# Patient Record
Sex: MALE | Race: WHITE | Employment: FULL TIME | URBAN - METROPOLITAN AREA
[De-identification: names, ages, dates, MRNs, and addresses within clinical notes are randomized per-mention and may not be internally consistent; named-entity substitution may affect disease eponyms.]

---

## 2017-01-29 ENCOUNTER — HOSPITAL ENCOUNTER (OUTPATIENT)
Dept: OTHER | Age: 57
Discharge: HOME | End: 2017-01-30
Attending: PSYCHIATRY & NEUROLOGY | Admitting: PSYCHIATRY & NEUROLOGY

## 2017-01-29 DIAGNOSIS — G47.33 OSA (OBSTRUCTIVE SLEEP APNEA): ICD-10-CM

## 2017-02-08 ENCOUNTER — TELEPHONE (OUTPATIENT)
Dept: PULMONOLOGY | Age: 57
End: 2017-02-08

## 2017-02-15 ENCOUNTER — TELEPHONE (OUTPATIENT)
Dept: PULMONOLOGY | Age: 57
End: 2017-02-15

## 2017-03-20 ENCOUNTER — OFFICE VISIT (OUTPATIENT)
Dept: SLEEP MEDICINE | Age: 57
End: 2017-03-20

## 2017-03-20 VITALS
HEIGHT: 67 IN | DIASTOLIC BLOOD PRESSURE: 80 MMHG | HEART RATE: 81 BPM | SYSTOLIC BLOOD PRESSURE: 138 MMHG | WEIGHT: 215 LBS | BODY MASS INDEX: 33.74 KG/M2 | RESPIRATION RATE: 16 BRPM | OXYGEN SATURATION: 98 %

## 2017-03-20 DIAGNOSIS — G47.33 OSA TREATED WITH BIPAP: Primary | ICD-10-CM

## 2017-03-20 PROCEDURE — 99213 OFFICE O/P EST LOW 20 MIN: CPT | Performed by: PSYCHIATRY & NEUROLOGY

## 2017-03-20 ASSESSMENT — SLEEP AND FATIGUE QUESTIONNAIRES
HOW LIKELY ARE YOU TO NOD OFF OR FALL ASLEEP WHILE SITTING QUIETLY AFTER LUNCH WITHOUT ALCOHOL: 1
HOW LIKELY ARE YOU TO NOD OFF OR FALL ASLEEP IN A CAR, WHILE STOPPED FOR A FEW MINUTES IN TRAFFIC: 0
HOW LIKELY ARE YOU TO NOD OFF OR FALL ASLEEP WHILE WATCHING TV: 2
HOW LIKELY ARE YOU TO NOD OFF OR FALL ASLEEP WHEN YOU ARE A PASSENGER IN A CAR FOR AN HOUR WITHOUT A BREAK: 0
HOW LIKELY ARE YOU TO NOD OFF OR FALL ASLEEP WHILE SITTING AND READING: 0
HOW LIKELY ARE YOU TO NOD OFF OR FALL ASLEEP WHILE SITTING AND TALKING TO SOMEONE: 0
HOW LIKELY ARE YOU TO NOD OFF OR FALL ASLEEP WHILE LYING DOWN TO REST IN THE AFTERNOON WHEN CIRCUMSTANCES PERMIT: 2
ESS TOTAL SCORE: 6
HOW LIKELY ARE YOU TO NOD OFF OR FALL ASLEEP WHILE SITTING INACTIVE IN A PUBLIC PLACE: 1

## 2017-03-29 ENCOUNTER — HOSPITAL ENCOUNTER (OUTPATIENT)
Dept: OTHER | Age: 57
Discharge: HOME | End: 2017-03-29
Attending: INTERNAL MEDICINE | Admitting: INTERNAL MEDICINE

## 2017-03-29 VITALS
OXYGEN SATURATION: 96 % | HEART RATE: 93 BPM | TEMPERATURE: 98.4 F | RESPIRATION RATE: 18 BRPM | DIASTOLIC BLOOD PRESSURE: 91 MMHG | SYSTOLIC BLOOD PRESSURE: 151 MMHG

## 2017-03-29 LAB
RAPID INFLUENZA  B AGN: NEGATIVE
RAPID INFLUENZA A AGN: POSITIVE

## 2019-10-31 ENCOUNTER — HOSPITAL ENCOUNTER (OUTPATIENT)
Dept: GENERAL RADIOLOGY | Age: 59
Discharge: HOME | End: 2019-10-31
Payer: COMMERCIAL

## 2019-10-31 ENCOUNTER — HOSPITAL ENCOUNTER (OUTPATIENT)
Dept: NEUROLOGY | Age: 59
Discharge: HOME | End: 2019-10-31
Payer: COMMERCIAL

## 2019-10-31 ENCOUNTER — HOSPITAL ENCOUNTER (OUTPATIENT)
Age: 59
Discharge: HOME | End: 2019-10-31
Payer: COMMERCIAL

## 2019-10-31 DIAGNOSIS — M79.641 PAIN IN BOTH HANDS: ICD-10-CM

## 2019-10-31 DIAGNOSIS — M79.642 PAIN IN BOTH HANDS: ICD-10-CM

## 2019-10-31 PROCEDURE — 95910 NRV CNDJ TEST 7-8 STUDIES: CPT

## 2019-10-31 PROCEDURE — 73130 X-RAY EXAM OF HAND: CPT

## 2019-10-31 PROCEDURE — 95861 NEEDLE EMG 2 EXTREMITIES: CPT

## 2021-04-14 ENCOUNTER — APPOINTMENT (OUTPATIENT)
Dept: GENERAL RADIOLOGY | Age: 61
DRG: 177 | End: 2021-04-14
Payer: COMMERCIAL

## 2021-04-14 ENCOUNTER — APPOINTMENT (OUTPATIENT)
Dept: CT IMAGING | Age: 61
DRG: 177 | End: 2021-04-14
Payer: COMMERCIAL

## 2021-04-14 ENCOUNTER — HOSPITAL ENCOUNTER (INPATIENT)
Age: 61
LOS: 1 days | Discharge: HOME | DRG: 177 | End: 2021-04-15
Attending: INTERNAL MEDICINE | Admitting: INTERNAL MEDICINE
Payer: COMMERCIAL

## 2021-04-14 DIAGNOSIS — I26.99 PULMONARY EMBOLISM ON RIGHT (HCC): ICD-10-CM

## 2021-04-14 DIAGNOSIS — J12.9 VIRAL PNEUMONIA: ICD-10-CM

## 2021-04-14 DIAGNOSIS — E87.1 HYPONATREMIA: Primary | ICD-10-CM

## 2021-04-14 DIAGNOSIS — U07.1 COVID-19: ICD-10-CM

## 2021-04-14 DIAGNOSIS — R55 SYNCOPE AND COLLAPSE: ICD-10-CM

## 2021-04-14 LAB
A/G RATIO: 1.7 (ref 1.1–2.2)
ALBUMIN SERPL-MCNC: 4 G/DL (ref 3.4–5)
ALP BLD-CCNC: 77 U/L (ref 40–129)
ALT SERPL-CCNC: 33 U/L (ref 10–40)
ANION GAP SERPL CALCULATED.3IONS-SCNC: 10 MMOL/L (ref 3–16)
AST SERPL-CCNC: 42 U/L (ref 15–37)
BASOPHILS ABSOLUTE: 0 K/UL (ref 0–0.2)
BASOPHILS RELATIVE PERCENT: 0.2 %
BILIRUB SERPL-MCNC: 0.5 MG/DL (ref 0–1)
BILIRUBIN URINE: NEGATIVE
BLOOD, URINE: NEGATIVE
BUN BLDV-MCNC: 15 MG/DL (ref 7–20)
CALCIUM SERPL-MCNC: 8.2 MG/DL (ref 8.3–10.6)
CHLORIDE BLD-SCNC: 96 MMOL/L (ref 99–110)
CLARITY: CLEAR
CO2: 23 MMOL/L (ref 21–32)
COLOR: YELLOW
CREAT SERPL-MCNC: 1 MG/DL (ref 0.8–1.3)
D DIMER: 283 NG/ML DDU (ref 0–229)
EKG ATRIAL RATE: 80 BPM
EKG DIAGNOSIS: NORMAL
EKG P AXIS: 64 DEGREES
EKG P-R INTERVAL: 166 MS
EKG Q-T INTERVAL: 390 MS
EKG QRS DURATION: 102 MS
EKG QTC CALCULATION (BAZETT): 449 MS
EKG R AXIS: -26 DEGREES
EKG T AXIS: 45 DEGREES
EKG VENTRICULAR RATE: 80 BPM
EOSINOPHILS ABSOLUTE: 0 K/UL (ref 0–0.6)
EOSINOPHILS RELATIVE PERCENT: 0.1 %
GFR AFRICAN AMERICAN: >60
GFR NON-AFRICAN AMERICAN: >60
GLOBULIN: 2.4 G/DL
GLUCOSE BLD-MCNC: 119 MG/DL (ref 70–99)
GLUCOSE BLD-MCNC: 135 MG/DL (ref 70–99)
GLUCOSE URINE: NEGATIVE MG/DL
HCT VFR BLD CALC: 44.7 % (ref 40.5–52.5)
HEMOGLOBIN: 15.2 G/DL (ref 13.5–17.5)
KETONES, URINE: NEGATIVE MG/DL
LACTIC ACID: 0.9 MMOL/L (ref 0.4–2)
LEUKOCYTE ESTERASE, URINE: NEGATIVE
LIPASE: 68 U/L (ref 13–60)
LYMPHOCYTES ABSOLUTE: 1.2 K/UL (ref 1–5.1)
LYMPHOCYTES RELATIVE PERCENT: 18.8 %
MCH RBC QN AUTO: 28.1 PG (ref 26–34)
MCHC RBC AUTO-ENTMCNC: 34.1 G/DL (ref 31–36)
MCV RBC AUTO: 82.4 FL (ref 80–100)
MICROSCOPIC EXAMINATION: NORMAL
MONOCYTES ABSOLUTE: 0.3 K/UL (ref 0–1.3)
MONOCYTES RELATIVE PERCENT: 5.5 %
NEUTROPHILS ABSOLUTE: 4.7 K/UL (ref 1.7–7.7)
NEUTROPHILS RELATIVE PERCENT: 75.4 %
NITRITE, URINE: NEGATIVE
PDW BLD-RTO: 13.9 % (ref 12.4–15.4)
PERFORMED ON: ABNORMAL
PH UA: 6 (ref 5–8)
PLATELET # BLD: 106 K/UL (ref 135–450)
PMV BLD AUTO: 9 FL (ref 5–10.5)
POTASSIUM REFLEX MAGNESIUM: 3.8 MMOL/L (ref 3.5–5.1)
PRO-BNP: 52 PG/ML (ref 0–124)
PROTEIN UA: NEGATIVE MG/DL
RBC # BLD: 5.42 M/UL (ref 4.2–5.9)
SODIUM BLD-SCNC: 129 MMOL/L (ref 136–145)
SPECIFIC GRAVITY UA: >1.03 (ref 1–1.03)
TOTAL PROTEIN: 6.4 G/DL (ref 6.4–8.2)
TROPONIN: <0.01 NG/ML
URINE REFLEX TO CULTURE: NORMAL
URINE TYPE: NORMAL
UROBILINOGEN, URINE: 0.2 E.U./DL
WBC # BLD: 6.2 K/UL (ref 4–11)

## 2021-04-14 PROCEDURE — 83605 ASSAY OF LACTIC ACID: CPT

## 2021-04-14 PROCEDURE — 80053 COMPREHEN METABOLIC PANEL: CPT

## 2021-04-14 PROCEDURE — 70450 CT HEAD/BRAIN W/O DYE: CPT

## 2021-04-14 PROCEDURE — 84484 ASSAY OF TROPONIN QUANT: CPT

## 2021-04-14 PROCEDURE — 2580000003 HC RX 258: Performed by: NURSE PRACTITIONER

## 2021-04-14 PROCEDURE — 83690 ASSAY OF LIPASE: CPT

## 2021-04-14 PROCEDURE — 71260 CT THORAX DX C+: CPT

## 2021-04-14 PROCEDURE — 93010 ELECTROCARDIOGRAM REPORT: CPT | Performed by: INTERNAL MEDICINE

## 2021-04-14 PROCEDURE — 94761 N-INVAS EAR/PLS OXIMETRY MLT: CPT

## 2021-04-14 PROCEDURE — 83036 HEMOGLOBIN GLYCOSYLATED A1C: CPT

## 2021-04-14 PROCEDURE — 36415 COLL VENOUS BLD VENIPUNCTURE: CPT

## 2021-04-14 PROCEDURE — 85379 FIBRIN DEGRADATION QUANT: CPT

## 2021-04-14 PROCEDURE — 6360000002 HC RX W HCPCS: Performed by: INTERNAL MEDICINE

## 2021-04-14 PROCEDURE — 1200000000 HC SEMI PRIVATE

## 2021-04-14 PROCEDURE — 99285 EMERGENCY DEPT VISIT HI MDM: CPT

## 2021-04-14 PROCEDURE — 85025 COMPLETE CBC W/AUTO DIFF WBC: CPT

## 2021-04-14 PROCEDURE — 6370000000 HC RX 637 (ALT 250 FOR IP): Performed by: NURSE PRACTITIONER

## 2021-04-14 PROCEDURE — 81003 URINALYSIS AUTO W/O SCOPE: CPT

## 2021-04-14 PROCEDURE — 2580000003 HC RX 258: Performed by: INTERNAL MEDICINE

## 2021-04-14 PROCEDURE — 83880 ASSAY OF NATRIURETIC PEPTIDE: CPT

## 2021-04-14 PROCEDURE — 6360000004 HC RX CONTRAST MEDICATION: Performed by: NURSE PRACTITIONER

## 2021-04-14 PROCEDURE — 6370000000 HC RX 637 (ALT 250 FOR IP): Performed by: INTERNAL MEDICINE

## 2021-04-14 PROCEDURE — 71045 X-RAY EXAM CHEST 1 VIEW: CPT

## 2021-04-14 PROCEDURE — 93005 ELECTROCARDIOGRAM TRACING: CPT | Performed by: NURSE PRACTITIONER

## 2021-04-14 RX ORDER — METOPROLOL SUCCINATE 50 MG/1
100 TABLET, EXTENDED RELEASE ORAL DAILY
Status: DISCONTINUED | OUTPATIENT
Start: 2021-04-15 | End: 2021-04-15 | Stop reason: HOSPADM

## 2021-04-14 RX ORDER — BUDESONIDE AND FORMOTEROL FUMARATE DIHYDRATE 160; 4.5 UG/1; UG/1
2 AEROSOL RESPIRATORY (INHALATION) 2 TIMES DAILY
Status: DISCONTINUED | OUTPATIENT
Start: 2021-04-14 | End: 2021-04-15 | Stop reason: HOSPADM

## 2021-04-14 RX ORDER — NICOTINE POLACRILEX 4 MG
15 LOZENGE BUCCAL PRN
Status: DISCONTINUED | OUTPATIENT
Start: 2021-04-14 | End: 2021-04-15 | Stop reason: HOSPADM

## 2021-04-14 RX ORDER — SODIUM CHLORIDE 0.9 % (FLUSH) 0.9 %
5-40 SYRINGE (ML) INJECTION PRN
Status: DISCONTINUED | OUTPATIENT
Start: 2021-04-14 | End: 2021-04-15 | Stop reason: HOSPADM

## 2021-04-14 RX ORDER — PROMETHAZINE HYDROCHLORIDE 25 MG/1
12.5 TABLET ORAL EVERY 6 HOURS PRN
Status: DISCONTINUED | OUTPATIENT
Start: 2021-04-14 | End: 2021-04-15 | Stop reason: HOSPADM

## 2021-04-14 RX ORDER — POLYETHYLENE GLYCOL 3350 17 G/17G
17 POWDER, FOR SOLUTION ORAL DAILY PRN
Status: DISCONTINUED | OUTPATIENT
Start: 2021-04-14 | End: 2021-04-15 | Stop reason: HOSPADM

## 2021-04-14 RX ORDER — DEXTROSE MONOHYDRATE 50 MG/ML
100 INJECTION, SOLUTION INTRAVENOUS PRN
Status: DISCONTINUED | OUTPATIENT
Start: 2021-04-14 | End: 2021-04-15 | Stop reason: HOSPADM

## 2021-04-14 RX ORDER — SODIUM CHLORIDE 0.9 % (FLUSH) 0.9 %
5-40 SYRINGE (ML) INJECTION EVERY 12 HOURS SCHEDULED
Status: DISCONTINUED | OUTPATIENT
Start: 2021-04-14 | End: 2021-04-15 | Stop reason: HOSPADM

## 2021-04-14 RX ORDER — NIFEDIPINE 30 MG/1
30 TABLET, EXTENDED RELEASE ORAL DAILY
Status: DISCONTINUED | OUTPATIENT
Start: 2021-04-15 | End: 2021-04-15 | Stop reason: HOSPADM

## 2021-04-14 RX ORDER — 0.9 % SODIUM CHLORIDE 0.9 %
1000 INTRAVENOUS SOLUTION INTRAVENOUS ONCE
Status: COMPLETED | OUTPATIENT
Start: 2021-04-14 | End: 2021-04-14

## 2021-04-14 RX ORDER — ACETAMINOPHEN 500 MG
1000 TABLET ORAL ONCE
Status: COMPLETED | OUTPATIENT
Start: 2021-04-14 | End: 2021-04-14

## 2021-04-14 RX ORDER — ACETAMINOPHEN 325 MG/1
650 TABLET ORAL EVERY 6 HOURS PRN
Status: DISCONTINUED | OUTPATIENT
Start: 2021-04-14 | End: 2021-04-15 | Stop reason: HOSPADM

## 2021-04-14 RX ORDER — MONTELUKAST SODIUM 10 MG/1
10 TABLET ORAL NIGHTLY
Status: DISCONTINUED | OUTPATIENT
Start: 2021-04-14 | End: 2021-04-15 | Stop reason: HOSPADM

## 2021-04-14 RX ORDER — ONDANSETRON 2 MG/ML
4 INJECTION INTRAMUSCULAR; INTRAVENOUS EVERY 6 HOURS PRN
Status: DISCONTINUED | OUTPATIENT
Start: 2021-04-14 | End: 2021-04-15 | Stop reason: HOSPADM

## 2021-04-14 RX ORDER — ACETAMINOPHEN 650 MG/1
650 SUPPOSITORY RECTAL EVERY 6 HOURS PRN
Status: DISCONTINUED | OUTPATIENT
Start: 2021-04-14 | End: 2021-04-15 | Stop reason: HOSPADM

## 2021-04-14 RX ORDER — LEVOTHYROXINE SODIUM 112 UG/1
112 TABLET ORAL DAILY
Status: DISCONTINUED | OUTPATIENT
Start: 2021-04-15 | End: 2021-04-15 | Stop reason: HOSPADM

## 2021-04-14 RX ORDER — PRAVASTATIN SODIUM 20 MG
20 TABLET ORAL NIGHTLY
Status: DISCONTINUED | OUTPATIENT
Start: 2021-04-14 | End: 2021-04-15 | Stop reason: HOSPADM

## 2021-04-14 RX ORDER — SODIUM CHLORIDE 9 MG/ML
INJECTION, SOLUTION INTRAVENOUS CONTINUOUS
Status: DISCONTINUED | OUTPATIENT
Start: 2021-04-14 | End: 2021-04-15 | Stop reason: HOSPADM

## 2021-04-14 RX ORDER — GUAIFENESIN/DEXTROMETHORPHAN 100-10MG/5
5 SYRUP ORAL ONCE
Status: COMPLETED | OUTPATIENT
Start: 2021-04-14 | End: 2021-04-14

## 2021-04-14 RX ORDER — DEXTROSE MONOHYDRATE 25 G/50ML
12.5 INJECTION, SOLUTION INTRAVENOUS PRN
Status: DISCONTINUED | OUTPATIENT
Start: 2021-04-14 | End: 2021-04-15 | Stop reason: HOSPADM

## 2021-04-14 RX ORDER — SODIUM CHLORIDE 9 MG/ML
25 INJECTION, SOLUTION INTRAVENOUS PRN
Status: DISCONTINUED | OUTPATIENT
Start: 2021-04-14 | End: 2021-04-15 | Stop reason: HOSPADM

## 2021-04-14 RX ADMIN — ENOXAPARIN SODIUM 90 MG: 100 INJECTION SUBCUTANEOUS at 17:52

## 2021-04-14 RX ADMIN — MONTELUKAST 10 MG: 10 TABLET, FILM COATED ORAL at 20:36

## 2021-04-14 RX ADMIN — Medication 2 PUFF: at 20:47

## 2021-04-14 RX ADMIN — ACETAMINOPHEN 1000 MG: 500 TABLET ORAL at 11:36

## 2021-04-14 RX ADMIN — PRAVASTATIN SODIUM 20 MG: 20 TABLET ORAL at 20:36

## 2021-04-14 RX ADMIN — IOPAMIDOL 75 ML: 755 INJECTION, SOLUTION INTRAVENOUS at 11:43

## 2021-04-14 RX ADMIN — SODIUM CHLORIDE 1000 ML: 9 INJECTION, SOLUTION INTRAVENOUS at 11:35

## 2021-04-14 RX ADMIN — Medication 10 ML: at 20:36

## 2021-04-14 RX ADMIN — SODIUM CHLORIDE: 9 INJECTION, SOLUTION INTRAVENOUS at 16:43

## 2021-04-14 RX ADMIN — GUAIFENESIN SYRUP AND DEXTROMETHORPHAN 5 ML: 100; 10 SYRUP ORAL at 11:35

## 2021-04-14 ASSESSMENT — PAIN SCALES - GENERAL: PAINLEVEL_OUTOF10: 0

## 2021-04-14 NOTE — PROGRESS NOTES
4 Eyes Skin Assessment     NAME:  Mazin Cervantes  YOB: 1960  MEDICAL RECORD NUMBER:  [id number]    The patient is being assess for  Admission    I agree that 2 RN's have performed a thorough Head to Toe Skin Assessment on the patient. ALL assessment sites listed below have been assessed. Areas assessed by both nurses:    Head, Face, Ears, Shoulders, Back, Chest, Arms, Elbows, Hands, Sacrum. Buttock, Coccyx, Ischium and Legs. Feet and Heels        Does the Patient have a Wound?  No noted wound(s)       Kj Prevention initiated:  No   Wound Care Orders initiated:  No    Pressure Injury (Stage 3,4, Unstageable, DTI, NWPT, and Complex wounds) if present place consult order under [de-identified] No    New and Established Ostomies if present place consult order under : No      Nurse 1 eSignature: Electronically signed by Tressie Nissen, RN on 4/14/21 at 6:57 PM EDT    **SHARE this note so that the co-signing nurse is able to place an eSignature**    Nurse 2 eSignature: Electronically signed by Paulo Davey RN on 4/15/21 at 1:36 AM EDT

## 2021-04-14 NOTE — ED PROVIDER NOTES
[address]  [address] [address]  Dept: [phone]  Loc: [address] ENCOUNTER        This patient was not seen or evaluated by the attending physician. I evaluated this patient, the attending physician was available for consultation. CHIEF COMPLAINT    Chief Complaint   Patient presents with    Fatigue     covid + , this morning felt weak and passed out, not SOB       HPI    Mazin Cervantes is a 59 y.o. male who presents with a syncopal episode. Onset was just shortly prior to arrival. The duration of the syncopal episode was very brief, states that he did have some prodromal symptoms. States that before he syncopized he felt weak, lightheaded and like he was going to pass out. He states that next thing he knows son was standing over him. He was diagnosed with Covid-19 approximately 1 week ago. Has not been eating or drinking very much since then. Has not complained of any chest pain or shortness of breath currently. Denies any specific head injury that he knows of. Is not on any anticoagulants. No post injury nausea or vomiting. No numbness or tingling in any extremities. Denies any focal weakness, trouble speaking or swallowing. The patient was brought in by his son for further evaluation and treatment. REVIEW OF SYSTEMS    Cardiac: no palpitations, no chest pain  Respiratory: no SOB, no new cough  Neurologic: see HPI, no headache, no double vision  GI: no vomiting, no diarrhea  Hematologic: no hematochezia, no hemoptysis  General: no fever, no chills  Musculoskeletal: see HPI  All other systems reviewed and are negative. Jericho Herrera PAST MEDICAL & SURGICAL HISTORY    Past Medical History:   Diagnosis Date    Allergic rhinitis     Asthma     Hypertension     Hypothyroidism     Influenza A 03/29/2017     History reviewed. No pertinent surgical history.     CURRENT MEDICATIONS  (may include discharge medications prescribed in the ED)  Current Outpatient Rx   Medication Sig Dispense Refill    pravastatin (PRAVACHOL) 20 MG tablet TAKE ONE TABLET BY MOUTH DAILY 30 tablet 3    montelukast (SINGULAIR) 10 MG tablet TAKE ONE TABLET BY MOUTH ONCE NIGHTLY 30 tablet 3    NIFEdipine (PROCARDIA XL) 30 MG extended release tablet TAKE ONE TABLET BY MOUTH DAILY 30 tablet 3    metoprolol succinate (TOPROL XL) 100 MG extended release tablet TAKE 1 AND 1/2 TABLET BY MOUTH DAILY 45 tablet 0    levothyroxine (SYNTHROID) 112 MCG tablet 1 tab po daily 90 tablet 0    fluticasone-salmeterol (ADVAIR DISKUS) 250-50 MCG/DOSE AEPB INHALE ONE PUFF BY MOUTH TWICE A DAY 1 Inhaler 3    metFORMIN (GLUCOPHAGE) 500 MG tablet TAKE ONE TABLET BY MOUTH TWICE A DAY WITH MEALS 60 tablet 2    fluticasone (FLONASE) 50 MCG/ACT nasal spray SPRAY TWO SPRAYS IN EACH NOSTRIL ONCE DAILY 1 Bottle 2    metoprolol succinate (TOPROL XL) 100 MG extended release tablet Take 1 tablet by mouth daily 30 tablet 3    albuterol sulfate HFA (VENTOLIN HFA) 108 (90 Base) MCG/ACT inhaler Inhale 2 puffs into the lungs 4 times daily as needed for Wheezing 3 Inhaler 1       ALLERGIES    Allergies   Allergen Reactions    Alcohol     Asa [Aspirin] Other (See Comments)    Ibuprofen Other (See Comments)       SOCIAL & FAMILY HISTORY    Social History     Socioeconomic History    Marital status: Single     Spouse name: None    Number of children: None    Years of education: None    Highest education level: None   Occupational History    None   Social Needs    Financial resource strain: None    Food insecurity     Worry: None     Inability: None    Transportation needs     Medical: None     Non-medical: None   Tobacco Use    Smoking status: Former Smoker     Quit date: 1986     Years since quittin.3    Smokeless tobacco: Never Used   Substance and Sexual Activity    Alcohol use: No    Drug use: No    Sexual activity: None Lifestyle    Physical activity     Days per week: None     Minutes per session: None    Stress: None   Relationships    Social connections     Talks on phone: None     Gets together: None     Attends Congregational service: None     Active member of club or organization: None     Attends meetings of clubs or organizations: None     Relationship status: None    Intimate partner violence     Fear of current or ex partner: None     Emotionally abused: None     Physically abused: None     Forced sexual activity: None   Other Topics Concern    None   Social History Narrative    Caffeine:        SODA - 1-2 week          TEA - 0            COFFEE - 3 cups a day     Family History   Problem Relation Age of Onset    High Cholesterol Mother     High Cholesterol Father        PHYSICAL EXAM    VITAL SIGNS: /67   Pulse 78   Temp 98.2 °F (36.8 °C) (Oral)   Resp 18   Ht 5' 7\" (1.702 m)   Wt 207 lb 15 oz (94.3 kg)   SpO2 92%   BMI 32.57 kg/m²    Constitutional:  Well developed, well nourished, no acute distress  Eyes: Pupils equally round and reactive to light, sclera nonicteric  HENT: Normocephalic, atraumatic, see integument, moist mucus membranes, no hemotympanum bilaterally, external ear canals are not erythematous or edematous, bilateral TMs gray and nonbulging  Neck: supple, no JVD, no posterior neck tenderness  Respiratory:  Lungs clear to auscultation bilaterally, no retractions   Cardiovascular:  Regular rate, no murmurs  GI:  Soft, nontender, normal bowel sounds  Musculoskeletal:  No edema, no acute deformities  Integument:  Skin warm and dry, no petechiae, skin is intact.  Slightly pale  Neurologic: Awake, alert, oriented x4, no aphasia, no slurred speech, CN II-XII intact, normal finger to nose test bilaterally, equal strength in all 4 extremities and moves all 4 extremities spontaneously, sensation to light touch intact bilaterally, patellar reflexes 2+ and equal bilaterally  Vascular: Radial and DP pulses 2+ and equal bilaterally  Psychiatric: Cooperative, pleasant affect     EKG   Please see the physician's note for EKG interpretation. RADIOLOGY  CT CHEST PULMONARY EMBOLISM W CONTRAST   Final Result   1. Subtle filling defect within the right upper lobe segmental pulmonary   artery concerning for an acute pulmonary thromboembolus. 2. Nonspecific bilateral ground-glass opacities favoring   infectious/inflammatory process including atypical viral pneumonia. Critical results were called by Dr. Triston Linda. Edmund Davies MD to Dr. Krista Zaragoza On   4/14/2021 at 12:51. XR CHEST PORTABLE   Final Result   1. No acute abnormality. CT Head WO Contrast   Final Result   No acute intracranial abnormality. Paranasal sinus inflammatory disease.   Possibly of acute sinusitis cannot be   excluded             LABS  Results for orders placed or performed during the hospital encounter of 04/14/21   CBC Auto Differential   Result Value Ref Range    WBC 6.2 4.0 - 11.0 K/uL    RBC 5.42 4.20 - 5.90 M/uL    Hemoglobin 15.2 13.5 - 17.5 g/dL    Hematocrit 44.7 40.5 - 52.5 %    MCV 82.4 80.0 - 100.0 fL    MCH 28.1 26.0 - 34.0 pg    MCHC 34.1 31.0 - 36.0 g/dL    RDW 13.9 12.4 - 15.4 %    Platelets 305 (L) 472 - 450 K/uL    MPV 9.0 5.0 - 10.5 fL    Neutrophils % 75.4 %    Lymphocytes % 18.8 %    Monocytes % 5.5 %    Eosinophils % 0.1 %    Basophils % 0.2 %    Neutrophils Absolute 4.7 1.7 - 7.7 K/uL    Lymphocytes Absolute 1.2 1.0 - 5.1 K/uL    Monocytes Absolute 0.3 0.0 - 1.3 K/uL    Eosinophils Absolute 0.0 0.0 - 0.6 K/uL    Basophils Absolute 0.0 0.0 - 0.2 K/uL   Comprehensive Metabolic Panel w/ Reflex to MG   Result Value Ref Range    Sodium 129 (L) 136 - 145 mmol/L    Potassium reflex Magnesium 3.8 3.5 - 5.1 mmol/L    Chloride 96 (L) 99 - 110 mmol/L    CO2 23 21 - 32 mmol/L    Anion Gap 10 3 - 16    Glucose 135 (H) 70 - 99 mg/dL    BUN 15 7 - 20 mg/dL    CREATININE 1.0 0.8 - 1.3 mg/dL    GFR Non-African American >60 >60    GFR African American >60 >60    Calcium 8.2 (L) 8.3 - 10.6 mg/dL    Total Protein 6.4 6.4 - 8.2 g/dL    Albumin 4.0 3.4 - 5.0 g/dL    Albumin/Globulin Ratio 1.7 1.1 - 2.2    Total Bilirubin 0.5 0.0 - 1.0 mg/dL    Alkaline Phosphatase 77 40 - 129 U/L    ALT 33 10 - 40 U/L    AST 42 (H) 15 - 37 U/L    Globulin 2.4 g/dL   Lactic Acid, Plasma   Result Value Ref Range    Lactic Acid 0.9 0.4 - 2.0 mmol/L   Lipase   Result Value Ref Range    Lipase 68.0 (H) 13.0 - 60.0 U/L   Troponin   Result Value Ref Range    Troponin <0.01 <0.01 ng/mL   Brain Natriuretic Peptide   Result Value Ref Range    Pro-BNP 52 0 - 124 pg/mL   Urinalysis Reflex to Culture    Specimen: Urine, clean catch   Result Value Ref Range    Urine Type NotGiven    D-Dimer, Quantitative   Result Value Ref Range    D-Dimer, Quant 283 (H) 0 - 229 ng/mL DDU   EKG 12 Lead   Result Value Ref Range    Ventricular Rate 80 BPM    Atrial Rate 80 BPM    P-R Interval 166 ms    QRS Duration 102 ms    Q-T Interval 390 ms    QTc Calculation (Bazett) 449 ms    P Axis 64 degrees    R Axis -26 degrees    T Axis 45 degrees    Diagnosis       Normal sinus rhythmNormal ECGNo previous ECGs available         ED COURSE & MEDICAL DECISION MAKING    Pertinent Labs & Imaging studies reviewed and interpreted. (See chart for details)  See chart for details of medications given during the ED stay. Vitals:    04/14/21 1132 04/14/21 1232 04/14/21 1246 04/14/21 1317   BP: 113/64 121/67 124/71 121/67   Pulse: 80 78 78 78   Resp: 19 21 20 18   Temp:       TempSrc:       SpO2: 92% 94% 93% 92%   Weight:       Height:           Differential Diagnosis: Cardiac arrhythmia, drop attack from a subarachnoid hemorrhage, sepsis, dehydration, vasovagal syncope, other    CRITICAL CARE NOTE:  There was a high probability of clinically significant life-threatening deterioration of the patient's condition requiring my urgent intervention. Total critical care time was at least 45 minutes. This includes vital sign monitoring, pulse oximetry monitoring, telemetry monitoring, clinical response to the IV medications, reviewing the nursing notes, consultation time, dictation/documentation time, and interpretation of the labwork. This excludes any separately billable procedures performed. Cardiac Monitor Strip Interpretation  Interpreted by me  Monitor strip interpreted:  Rhythm: normal sinus   Rate: normal  Ectopy: none  Ventricular rate of 80 bpm, ID interval 166, , QTc 449. Patient is afebrile and nontoxic in appearance. Labs reveal no leukocytosis or anemia. Metabolic panel shows no significant electrolyte derangements however he has a slight hyponatremia of 129. Is getting IV fluids. No INES. LFTs and lipase within normal limits. No lactic acidosis. CXR findings as above. CT findings as above. EKG interpreted by ED physician. Troponin negative. Given that he did syncopized I elected to do a D-dimer, especially given that he is Covid positive and there is an increased incidence for PEs associated with Covid-19. This is elevated at 283. Therefore CTA to rule out PE was ordered, full radiology read as above. BNP is not elevated and there does not appear to be any right heart strain. Based off of her Covid risk decompensation score, patient is in a high risk category for decompensation within 24 hours due to Covid-19. Given this patient will need to be admitted for further evaluation and treatment. However given the patient does have a Covid-19, the primary care provider is not admitting these patients at this time. Therefore consulted the hospitalist who agreed to admit patient and write orders for admission. FINAL IMPRESSION    1. Hyponatremia    2. COVID-19    3. Syncope and collapse    4. Pulmonary embolism on right ([address]    5.  Viral pneumonia        PLAN  Admission       (Please note that this note was completed with a voice recognition program.  Every

## 2021-04-14 NOTE — PROGRESS NOTES
This RN received call from [facility] stating new pt in 4270 has arrived. This RN did not receive report. This RN greeted pt he denies any current needs. This RN called down to ED spoke with Charge RN who stated \"I do not know who was taking care of this pt and I will find a RN to call you back. \"    [phone] received call from Juliana [place] in ED who gave report.

## 2021-04-14 NOTE — ED NOTES
ED SBAR report provider to [facility] BONNY LINARES. Patient to be transported to Room 4270 via stretcher by transport tech. Patient transported with bedside cardiac monitor and with IV medications infusing. IV site clean, dry, and intact. MEWS score and pain assessed as 0 and documented. Updated patient on plan of care.        Lurdes Clements RN  04/14/21 1716

## 2021-04-15 VITALS
DIASTOLIC BLOOD PRESSURE: 74 MMHG | OXYGEN SATURATION: 93 % | TEMPERATURE: 98.7 F | SYSTOLIC BLOOD PRESSURE: 140 MMHG | RESPIRATION RATE: 16 BRPM | BODY MASS INDEX: 34.01 KG/M2 | WEIGHT: 216.71 LBS | HEART RATE: 87 BPM | HEIGHT: 67 IN

## 2021-04-15 LAB
ANION GAP SERPL CALCULATED.3IONS-SCNC: 12 MMOL/L (ref 3–16)
BASOPHILS ABSOLUTE: 0 K/UL (ref 0–0.2)
BASOPHILS RELATIVE PERCENT: 0.3 %
BUN BLDV-MCNC: 12 MG/DL (ref 7–20)
CALCIUM SERPL-MCNC: 7.7 MG/DL (ref 8.3–10.6)
CHLORIDE BLD-SCNC: 96 MMOL/L (ref 99–110)
CO2: 21 MMOL/L (ref 21–32)
CREAT SERPL-MCNC: 0.8 MG/DL (ref 0.8–1.3)
EOSINOPHILS ABSOLUTE: 0 K/UL (ref 0–0.6)
EOSINOPHILS RELATIVE PERCENT: 0 %
ESTIMATED AVERAGE GLUCOSE: 128.4 MG/DL
GFR AFRICAN AMERICAN: >60
GFR NON-AFRICAN AMERICAN: >60
GLUCOSE BLD-MCNC: 108 MG/DL (ref 70–99)
GLUCOSE BLD-MCNC: 111 MG/DL (ref 70–99)
HBA1C MFR BLD: 6.1 %
HCT VFR BLD CALC: 41.2 % (ref 40.5–52.5)
HEMOGLOBIN: 14.2 G/DL (ref 13.5–17.5)
LYMPHOCYTES ABSOLUTE: 1.6 K/UL (ref 1–5.1)
LYMPHOCYTES RELATIVE PERCENT: 26.1 %
MCH RBC QN AUTO: 28.6 PG (ref 26–34)
MCHC RBC AUTO-ENTMCNC: 34.5 G/DL (ref 31–36)
MCV RBC AUTO: 82.9 FL (ref 80–100)
MONOCYTES ABSOLUTE: 0.2 K/UL (ref 0–1.3)
MONOCYTES RELATIVE PERCENT: 3.8 %
NEUTROPHILS ABSOLUTE: 4.4 K/UL (ref 1.7–7.7)
NEUTROPHILS RELATIVE PERCENT: 69.8 %
PDW BLD-RTO: 13.9 % (ref 12.4–15.4)
PERFORMED ON: ABNORMAL
PLATELET # BLD: 108 K/UL (ref 135–450)
PMV BLD AUTO: 9.2 FL (ref 5–10.5)
POTASSIUM REFLEX MAGNESIUM: 3.8 MMOL/L (ref 3.5–5.1)
RBC # BLD: 4.97 M/UL (ref 4.2–5.9)
SODIUM BLD-SCNC: 129 MMOL/L (ref 136–145)
WBC # BLD: 6.3 K/UL (ref 4–11)

## 2021-04-15 PROCEDURE — 94640 AIRWAY INHALATION TREATMENT: CPT

## 2021-04-15 PROCEDURE — 97116 GAIT TRAINING THERAPY: CPT

## 2021-04-15 PROCEDURE — 80048 BASIC METABOLIC PNL TOTAL CA: CPT

## 2021-04-15 PROCEDURE — 93970 EXTREMITY STUDY: CPT

## 2021-04-15 PROCEDURE — 6370000000 HC RX 637 (ALT 250 FOR IP): Performed by: INTERNAL MEDICINE

## 2021-04-15 PROCEDURE — 97530 THERAPEUTIC ACTIVITIES: CPT

## 2021-04-15 PROCEDURE — 97161 PT EVAL LOW COMPLEX 20 MIN: CPT

## 2021-04-15 PROCEDURE — 97165 OT EVAL LOW COMPLEX 30 MIN: CPT

## 2021-04-15 PROCEDURE — 94761 N-INVAS EAR/PLS OXIMETRY MLT: CPT

## 2021-04-15 PROCEDURE — 85025 COMPLETE CBC W/AUTO DIFF WBC: CPT

## 2021-04-15 PROCEDURE — 36415 COLL VENOUS BLD VENIPUNCTURE: CPT

## 2021-04-15 RX ADMIN — NIFEDIPINE 30 MG: 30 TABLET, FILM COATED, EXTENDED RELEASE ORAL at 09:58

## 2021-04-15 RX ADMIN — ACETAMINOPHEN 650 MG: 325 TABLET ORAL at 05:34

## 2021-04-15 RX ADMIN — LEVOTHYROXINE SODIUM 112 MCG: 0.11 TABLET ORAL at 05:34

## 2021-04-15 RX ADMIN — APIXABAN 10 MG: 5 TABLET, FILM COATED ORAL at 09:57

## 2021-04-15 RX ADMIN — Medication 2 PUFF: at 08:08

## 2021-04-15 RX ADMIN — METOPROLOL SUCCINATE 100 MG: 50 TABLET, EXTENDED RELEASE ORAL at 09:57

## 2021-04-15 ASSESSMENT — PAIN SCALES - GENERAL
PAINLEVEL_OUTOF10: 0

## 2021-04-15 NOTE — PROGRESS NOTES
reach; Left in chair           Patient Diagnosis(es): The primary encounter diagnosis was Hyponatremia. Diagnoses of COVID-19, Syncope and collapse, Pulmonary embolism on right [facility]), and Viral pneumonia were also pertinent to this visit. has a past medical history of Allergic rhinitis, Asthma, Hypertension, Hypothyroidism, and Influenza A.   has no past surgical history on file. Restrictions  Restrictions/Precautions  Restrictions/Precautions: Contact Precautions, Isolation  Position Activity Restriction  Other position/activity restrictions: COVID+; droplet precautions; RA    Subjective   General  Chart Reviewed: Yes  Patient assessed for rehabilitation services?: Yes  Additional Pertinent Hx: Per Zhanna Leon MD: Pt is \"59year-old male with past medical history of hypertension, hypothyroidism, hyperlipidemia presented to the hospital with syncopal episode at home. Patient states that he was in the kitchen when he was trying to get his medicines when he suddenly started feeling dizzy and fainted. Patient apparently fell to the floor. Jennifer Mendes He said that he regained conscious immediately and did not have any confusion. Due to this patient was brought to the hospital.  Of note patient was diagnosed with COVID-19 pneumonia about a week ago. On arrival he was noted to be hemodynamically stable. Lab work showed mild hyponatremia but otherwise fairly unremarkable. Patient had CT of the chest performed which revealed right upper lobe segmental pulmonary embolism. Also showed bilateral groundglass opacities concerning for viral pneumonia. He was admitted to the hospital for further management. \"  Family / Caregiver Present: No  Referring Practitioner: Zhanna Leon MD  Diagnosis: COVID  Subjective  Subjective: Pt met bedside, agreeable for therapy evaluation and OOB activity. Pt pleasant, reports he is feeling better. No concerns regarding his breathing or managing at home.   Patient Currently in Pain: (No complaints of pain)  Vital Signs  Patient Currently in Pain: (No complaints of pain)  Oxygen Therapy  SpO2: 93 %  Pulse Oximeter Device Mode: Continuous  O2 Device: None (Room air)  Patient Observation  Observations: pt rinsed postmdi     Social/Functional History  Social/Functional History  Lives With: Son(and brother)  Type of Home: House  Home Layout: Two level, Laundry in basement  Home Access: Stairs to enter without rails  Bathroom Shower/Tub: Walk-in shower  Bathroom Toilet: Standard  ADL Assistance: Independent  Homemaking Assistance: Independent  Ambulation Assistance: Independent  Transfer Assistance: Independent  Active : Yes  Mode of Transportation: Car  Occupation: Full time employment  Type of occupation: HVAC for the [employer]       Objective   Vision: Within Functional Limits  Hearing: Within functional limits      Orientation  Overall Orientation Status: Within Functional Limits     Balance  Sitting Balance: Independent  Standing Balance: Independent  Standing Balance  Time: ~2 minutes  Activity: Func mob, transfers, and ADL tasks    Functional Mobility  Functional - Mobility Device: No device  Activity: Other(Around room several times)  Assist Level: Independent  Functional Mobility Comments: Pt completed functional mobility with no device independently around room. ADL  Additional Comments: PTA pt reports independence in self-care tasks. Anticipate pt is at baseline - has been up ad zeinab. Pt with no concerns regarding self-care tasks. Tone RUE  RUE Tone: Normotonic  Tone LUE  LUE Tone: Normotonic  Coordination  Movements Are Fluid And Coordinated: Yes     Bed mobility  Supine to Sit: Independent  Sit to Supine: Unable to assess(in recliner at end of session)     Transfers  Sit to stand: Independent  Stand to sit: Independent  Transfer Comments:  Indep for sit <> stand from EOB and sit to recliner chair     Cognition  Overall Cognitive Status: WFL        Sensation  Overall Sensation Status: WFL        LUE AROM (degrees)  LUE AROM : WFL  Left Hand AROM (degrees)  Left Hand AROM: WFL  RUE AROM (degrees)  RUE AROM : WFL  Right Hand AROM (degrees)  Right Hand AROM: WFL     LUE Strength  Gross LUE Strength: WFL  RUE Strength  Gross RUE Strength: WFL          Plan   Plan  Plan Comment: Pt discharged from OT services    AM-PAC Score   No further OT needs     AM-PAC Inpatient Daily Activity Raw Score: 24 (04/15/21 0844)  AM-PAC Inpatient ADL T-Scale Score : 57.54 (04/15/21 0844)  ADL Inpatient CMS 0-100% Score: 0 (04/15/21 0844)  ADL Inpatient CMS G-Code Modifier : [address] (04/15/21 5206)    Goals  Short term goals  Time Frame for Short term goals: Up ad zeinab - no further OT needs. Therapy Time   Individual Concurrent Group Co-treatment   Time In       0800   Time Out       0825   Minutes       25   Timed Code Treatment Minutes: 10 Minutes(15 min eval)     If pt is discharged prior to next OT session, this note will serve as the discharge summary.     LÓPEZ Guo/CHARLEEN#[id number]

## 2021-04-15 NOTE — PLAN OF CARE
Problem: Infection:  Goal: Will remain free from infection  Description: Will remain free from infection  4/14/2021 2334 by Tasha Ying RN  Outcome: Ongoing     Problem: Safety:  Goal: Free from accidental physical injury  Description: Free from accidental physical injury  4/14/2021 2334 by Tasha Ying RN  Outcome: Ongoing     Problem: Safety:  Goal: Free from intentional harm  Description: Free from intentional harm  4/14/2021 2334 by Tasha Ying RN  Outcome: Ongoing     Problem: Daily Care:  Goal: Daily care needs are met  Description: Daily care needs are met  4/14/2021 2334 by Tasha Yign RN  Outcome: Ongoing     Problem: Pain:  Goal: Patient's pain/discomfort is manageable  Description: Patient's pain/discomfort is manageable  4/14/2021 2334 by Tasha Ying RN  Outcome: Ongoing     Problem: Skin Integrity:  Goal: Skin integrity will stabilize  Description: Skin integrity will stabilize  4/14/2021 2334 by Tasha Ying RN  Outcome: Ongoing     Problem: Discharge Planning:  Goal: Patients continuum of care needs are met  Description: Patients continuum of care needs are met  4/14/2021 2334 by Tasha Ying RN  Outcome: Ongoing     Problem: Airway Clearance - Ineffective  Goal: Achieve or maintain patent airway  4/14/2021 2334 by Tasha Ying RN  Outcome: Ongoing     Problem: Gas Exchange - Impaired  Goal: Absence of hypoxia  4/14/2021 2334 by Tasha Ying RN  Outcome: Ongoing     Problem: Gas Exchange - Impaired  Goal: Promote optimal lung function  4/14/2021 2334 by Tasha Ying RN  Outcome: Ongoing     Problem: Breathing Pattern - Ineffective  Goal: Ability to achieve and maintain a regular respiratory rate  4/14/2021 2334 by Tasha Ying RN  Outcome: Ongoing     Problem: Body Temperature -  Risk of, Imbalanced  Goal: Ability to maintain a body temperature within defined limits  4/14/2021 2334 by Tasha Ying RN  Outcome: Ongoing     Problem:  Body Temperature -  Risk of, Imbalanced  Goal: Will regain or maintain usual level of consciousness  4/14/2021 2334 by Pee Davila RN  Outcome: Ongoing     Problem:  Body Temperature -  Risk of, Imbalanced  Goal: Complications related to the disease process, condition or treatment will be avoided or minimized  4/14/2021 2334 by Pee Davila RN  Outcome: Ongoing     Problem: Isolation Precautions - Risk of Spread of Infection  Goal: Prevent transmission of infection  4/14/2021 2334 by Pee Davila RN  Outcome: Ongoing     Problem: Nutrition Deficits  Goal: Optimize nutritional status  4/14/2021 2334 by Pee Davila RN  Outcome: Ongoing     Problem: Risk for Fluid Volume Deficit  Goal: Maintain normal heart rhythm  4/14/2021 2334 by Pee Davila RN  Outcome: Ongoing     Problem: Risk for Fluid Volume Deficit  Goal: Maintain absence of muscle cramping  4/14/2021 2334 by Pee Davila RN  Outcome: Ongoing     Problem: Risk for Fluid Volume Deficit  Goal: Maintain normal serum potassium, sodium, calcium, phosphorus, and pH  4/14/2021 2334 by Pee Davila RN  Outcome: Ongoing     Problem: Loneliness or Risk for Loneliness  Goal: Demonstrate positive use of time alone when socialization is not possible  4/14/2021 2334 by Pee Davila RN  Outcome: Ongoing     Problem: Fatigue  Goal: Verbalize increase energy and improved vitality  4/14/2021 2334 by Pee Davila RN  Outcome: Ongoing     Problem: Patient Education: Go to Patient Education Activity  Goal: Patient/Family Education  4/14/2021 2334 by Pee Davila RN  Outcome: Ongoing

## 2021-04-15 NOTE — PROGRESS NOTES
Pt was scheduled for Echo. Echo staff has left for the day. Per MD Dr. Mcgowan Arms pt able to leave and schedule Echo out pt. Removed IV and tele monitor. Pt  Tolerated well. Reviewed all discharge instructions with pt. Pt voiced understanding of all and will  rx. Transport placed for pt.

## 2021-04-15 NOTE — CARE COORDINATION
Discharge order noted. Sw call to patient's room due to isolation status. Patient reports he will have family transport home. PT/OT Discharge Recommendations:  Home independently. No discharge needs identified.      Antoine Holter MSW, LISW-S, Social Work  [phone]    Electronically signed by Antoine Holter, MSW, LSW on 4/15/2021 at 12:39 PM

## 2021-04-15 NOTE — PROGRESS NOTES
Yes  Additional Pertinent Hx: 68-year-old male with past medical history of hypertension, hypothyroidism, hyperlipidemia presented to the hospital on 4/14/21 with syncopal episode at home. Pt was diagnosed with COVID several days prior to admission. Response To Previous Treatment: Not applicable  Family / Caregiver Present: No  Referring Practitioner: Aura Banks MD  Referral Date : 04/14/21  Diagnosis: COVID  Follows Commands: Within Functional Limits  Subjective  Subjective: Pt is agreeable to PT. Denies pain. Denies SOB. Orientation  Orientation  Overall Orientation Status: Within Functional Limits     Social/Functional History  Social/Functional History  Lives With: Son(and brother)  Type of Home: House  Home Layout: Two level, Laundry in basement  Home Access: Stairs to enter without rails  Bathroom Shower/Tub: Walk-in shower  Bathroom Toilet: Standard  ADL Assistance: Independent  Homemaking Assistance: Independent  Ambulation Assistance: Independent  Transfer Assistance: Independent  Active : Yes  Mode of Transportation: Car  Occupation: Full time employment  Type of occupation: [employer] for the [employer]  Overall Cognitive Status: WNL    Objective  Strength RLE  Strength RLE: WNL  Strength LLE  Strength LLE: WNL  Motor Control  Gross Motor?: WNL     Bed mobility  Supine to Sit: Independent  Sit to Supine: Unable to assess(in recliner at end of session)  Transfers  Sit to Stand: Independent  Stand to sit: Independent  Ambulation  Ambulation?: Yes  Ambulation 1  Surface: level tile  Device: No Device  Assistance: Independent  Gait Deviations: None  Distance: 100'  Comments: O2 sats ranged from 91 to 93% while on RA.   Stairs/Curb  Stairs?: No     Balance  Posture: Good  Sitting - Static: Good  Sitting - Dynamic: Good  Standing - Static: Good  Standing - Dynamic: Good        Plan   Safety Devices  Type of devices: Call light within reach, Left in chair, Nurse notified    Joseluis Klein Score  AM-PAC Inpatient Mobility Raw Score : 24 (04/15/21 0826)  AM-PAC Inpatient T-Scale Score : 61.14 (04/15/21 0826)  Mobility Inpatient CMS 0-100% Score: 0 (04/15/21 0826)  Mobility Inpatient CMS G-Code Modifier : [facility] (04/15/21 3027)          Therapy Time   Individual Concurrent Group Co-treatment   Time In 0800         Time Out 0825         Minutes 25         Timed Code Treatment Minutes: 10 Minutes       Anibal Garcia, PT

## 2021-04-15 NOTE — ACP (ADVANCE CARE PLANNING)
Advance Care Planning     Advance Care Planning Activator (Inpatient)  Conversation Note      Date of ACP Conversation: 4/15/2021    Conversation Conducted with: Patient with Decision Making Capacity    ACP Activator: [facility] Decision Maker:     Current Designated Health Care Decision Maker:     Primary Decision Maker: Akhil Delfino - Brother/Sister - [phone]      Care Preferences    Ventilation: \"If you were in your present state of health and suddenly became very ill and were unable to breathe on your own, what would your preference be about the use of a ventilator (breathing machine) if it were available to you? \"      Would the patient desire the use of ventilator (breathing machine)?: yes    \"If your health worsens and it becomes clear that your chance of recovery is unlikely, what would your preference be about the use of a ventilator (breathing machine) if it were available to you? \"     Would the patient desire the use of ventilator (breathing machine)?: Yes      Resuscitation  \"CPR works best to restart the heart when there is a sudden event, like a heart attack, in someone who is otherwise healthy. Unfortunately, CPR does not typically restart the heart for people who have serious health conditions or who are very sick. \"    \"In the event your heart stopped as a result of an underlying serious health condition, would you want attempts to be made to restart your heart (answer \"yes\" for attempt to resuscitate) or would you prefer a natural death (answer \"no\" for do not attempt to resuscitate)? \" yes       [] Yes   [x] No   Educated Patient / Geoffrey Santos regarding differences between Advance Directives and portable DNR orders.     Length of ACP Conversation in minutes:      Conversation Outcomes:  [x] ACP discussion completed  [] Existing advance directive reviewed with patient; no changes to patient's previously recorded wishes  [] New Advance Directive completed  [] Portable Do Not Rescitate prepared for Provider review and signature  [] POLST/POST/MOLST/MOST prepared for Provider review and signature      Follow-up plan:    [] Schedule follow-up conversation to continue planning  [] Referred individual to Provider for additional questions/concerns   [] Advised patient/agent/surrogate to review completed ACP document and update if needed with changes in condition, patient preferences or care setting    [x] This note routed to one or more involved healthcare providers      Electronically signed by Marland Cowden, MSW, ELLI on 4/15/2021 at 12:41 PM

## 2021-04-15 NOTE — PLAN OF CARE
Problem: Infection:  Goal: Will remain free from infection  Description: Will remain free from infection  4/15/2021 1105 by Ruben Lara RN  Outcome: Ongoing  4/14/2021 2334 by Shamir Solomon RN  Outcome: Ongoing     Problem: Safety:  Goal: Free from accidental physical injury  Description: Free from accidental physical injury  4/15/2021 1105 by Ruben Lara RN  Outcome: Ongoing  4/14/2021 2334 by Shamir Solomon RN  Outcome: Ongoing  Goal: Free from intentional harm  Description: Free from intentional harm  4/15/2021 1105 by Ruben Lara RN  Outcome: Ongoing  4/14/2021 2334 by Shamir Solomon RN  Outcome: Ongoing     Problem: Daily Care:  Goal: Daily care needs are met  Description: Daily care needs are met  4/15/2021 1105 by Ruben Lara RN  Outcome: Ongoing  4/14/2021 2334 by Shamir Solomon RN  Outcome: Ongoing     Problem: Pain:  Goal: Patient's pain/discomfort is manageable  Description: Patient's pain/discomfort is manageable  4/15/2021 1105 by Ruben Lara RN  Outcome: Ongoing  4/14/2021 2334 by Shamir Solomon RN  Outcome: Ongoing     Problem: Skin Integrity:  Goal: Skin integrity will stabilize  Description: Skin integrity will stabilize  4/15/2021 1105 by Ruben Lara RN  Outcome: Ongoing  4/14/2021 2334 by Shamir Solomon RN  Outcome: Ongoing     Problem: Airway Clearance - Ineffective  Goal: Achieve or maintain patent airway  4/15/2021 1105 by Ruben Lara RN  Outcome: Ongoing  4/14/2021 2334 by Shamir Solomon RN  Outcome: Ongoing     Problem: Gas Exchange - Impaired  Goal: Absence of hypoxia  4/15/2021 1105 by Ruben Lara RN  Outcome: Ongoing  4/14/2021 2334 by Shamir Solomon RN  Outcome: Ongoing  Goal: Promote optimal lung function  4/15/2021 1105 by Ruben Lara RN  Outcome: Ongoing  4/14/2021 2334 by Shamir Solomon RN  Outcome: Ongoing     Problem: Breathing Pattern - Ineffective  Goal: Ability to achieve and maintain a regular respiratory rate  4/15/2021 1105 by Ángela Ribeiro RN  Outcome: Ongoing  4/14/2021 2334 by Willis Chow RN  Outcome: Ongoing     Problem:  Body Temperature -  Risk of, Imbalanced  Goal: Ability to maintain a body temperature within defined limits  4/15/2021 1105 by Ángela Ribeiro RN  Outcome: Ongoing  4/14/2021 2334 by Willis Chow RN  Outcome: Ongoing  Goal: Will regain or maintain usual level of consciousness  4/15/2021 1105 by Ángela Ribeiro RN  Outcome: Ongoing  4/14/2021 2334 by Willis Chow RN  Outcome: Ongoing  Goal: Complications related to the disease process, condition or treatment will be avoided or minimized  4/15/2021 1105 by Ángela Ribeiro RN  Outcome: Ongoing  4/14/2021 2334 by Willis Chow RN  Outcome: Ongoing     Problem: Isolation Precautions - Risk of Spread of Infection  Goal: Prevent transmission of infection  4/15/2021 1105 by Ángela Ribeiro RN  Outcome: Ongoing  4/14/2021 2334 by Willis Chow RN  Outcome: Ongoing     Problem: Nutrition Deficits  Goal: Optimize nutritional status  4/15/2021 1105 by Ángela Ribeiro RN  Outcome: Ongoing  4/14/2021 2334 by Willis Chow RN  Outcome: Ongoing     Problem: Risk for Fluid Volume Deficit  Goal: Maintain normal heart rhythm  4/15/2021 1105 by Ángela Ribeiro RN  Outcome: Ongoing  4/14/2021 2334 by Willis Chow RN  Outcome: Ongoing  Goal: Maintain absence of muscle cramping  4/15/2021 1105 by Ángela Ribeiro RN  Outcome: Ongoing  4/14/2021 2334 by Willis Chow RN  Outcome: Ongoing  Goal: Maintain normal serum potassium, sodium, calcium, phosphorus, and pH  4/15/2021 1105 by Ángela Ribeiro RN  Outcome: Ongoing  4/14/2021 2334 by Willis Chow RN  Outcome: Ongoing     Problem: Loneliness or Risk for Loneliness  Goal: Demonstrate positive use of time alone when socialization is not possible  4/15/2021 1105 by Ángela Ribeiro RN  Outcome: Ongoing  4/14/2021 2334 by Willis Chow RN  Outcome: Ongoing     Problem: Fatigue  Goal: Verbalize increase energy and improved vitality  4/15/2021 1105 by Camila Ferris RN  Outcome: Ongoing  4/14/2021 2334 by Tasha Ying RN  Outcome: Ongoing     Problem: Patient Education: Go to Patient Education Activity  Goal: Patient/Family Education  4/15/2021 1105 by Camila Ferris RN  Outcome: Ongoing  4/14/2021 2334 by Tasha Ying RN  Outcome: Ongoing

## 2021-04-15 NOTE — PROGRESS NOTES
Pt had a temp of 100.7 oral w/ midnight vitals.  Rechecked temp before giving tylenol, temp was 98.7 oral.    Electronically signed by Tasha Ying RN on 4/15/2021 at 1:35 AM

## 2021-04-16 ENCOUNTER — CARE COORDINATION (OUTPATIENT)
Dept: CASE MANAGEMENT | Age: 61
End: 2021-04-16

## 2021-04-16 NOTE — CARE COORDINATION
Challenges to be reviewed by the provider   Additional needs identified to be addressed with provider No  none             Method of communication with provider : none    Discussed COVID-19 related testing which was not done at this time. Test results were not done. Patient informed of results, if available? Not done with admission. Patient tested POSITIVE prior to admission. Advance Care Planning:   Does patient have an Advance Directive:  not on file. Was this a readmission? No  Patient stated reason for admission: felt dizzy & fell  Patients top risk factors for readmission: medical condition    Care Transition Nurse (CTN) contacted the patient by telephone to perform post hospital discharge assessment. Verified name and  with patient as identifiers. Provided introduction to self, and explanation of the CTN role. CTN reviewed discharge instructions, medical action plan and red flags with patient who verbalized understanding. Patient given an opportunity to ask questions and does not have any further questions or concerns at this time. Were discharge instructions available to patient? Yes. Reviewed appropriate site of care based on symptoms and resources available to patient including: PCP. The patient agrees to contact the PCP office for questions related to their healthcare. Covid Risk Education    Patient has following risk factors of: positive diagnosis. Education provided regarding infection prevention, and signs and symptoms of COVID-19 and when to seek medical attention with patient who verbalized understanding. Discussed exposure protocols and quarantine From CDC: Are you at higher risk for severe illness?   and given an opportunity for questions and concerns. The patient agrees to contact the COVID-19 hotline [phone] or PCP office for questions related to COVID-19.      For more information on steps you can take to protect yourself, see CDC's How to Protect Yourself Was patient discharged with a pulse oximeter? No Discussed and confirmed pulse oximeter discharge instructions and when to notify provider or seek emergency care. Patient/family/caregiver given information for [facility] and agrees to enroll no  Patient's preferred e-mail: declines  Patient's preferred phone number: declines    Discussed follow-up appointments. If no appointment was previously scheduled, appointment scheduling offered: Yes. Is follow up appointment scheduled within 7 days of discharge? No  Non-[facility] follow up appointment(s): None    Plan for follow-up call in 7-10 days based on severity of symptoms and risk factors. Plan for next call: symptom management-dizziness & fall  CTN provided contact information for future needs. Initial attempt at [place] [phone] monitoring discharge phone call. Jonathan Petit states he is \"not so bad\". He states he will reach out to his PCP to schedule a hospital follow up appointment. He will call scheduling to schedule a ECHO as directed. Jonathan Petit states his son will provide his transportation. He states he was able to obtain his discharge medication and has started that. Discussed Eliquis being a blood thinner and discussed to watch for signs of bleeding. Jonathan Petit denies any dizziness or falls since he has returned home. He denies any needs at this time.

## 2021-04-26 ENCOUNTER — CARE COORDINATION (OUTPATIENT)
Dept: CASE MANAGEMENT | Age: 61
End: 2021-04-26

## 2022-03-28 LAB — PROSTATE SPECIFIC ANTIGEN: 4.68 NG/ML (ref 0–4)

## 2023-01-12 PROBLEM — I26.99 ACUTE PULMONARY EMBOLISM WITHOUT ACUTE COR PULMONALE, UNSPECIFIED PULMONARY EMBOLISM TYPE (HCC): Status: RESOLVED | Noted: 2023-01-12 | Resolved: 2023-01-12

## 2023-01-12 PROBLEM — I26.99 ACUTE PULMONARY EMBOLISM WITHOUT ACUTE COR PULMONALE, UNSPECIFIED PULMONARY EMBOLISM TYPE (HCC): Status: ACTIVE | Noted: 2023-01-12

## 2023-08-17 ENCOUNTER — HOSPITAL ENCOUNTER (EMERGENCY)
Age: 63
Discharge: HOME | End: 2023-08-17
Attending: STUDENT IN AN ORGANIZED HEALTH CARE EDUCATION/TRAINING PROGRAM
Payer: MEDICARE

## 2023-08-17 VITALS
TEMPERATURE: 97.5 F | DIASTOLIC BLOOD PRESSURE: 89 MMHG | BODY MASS INDEX: 34.12 KG/M2 | SYSTOLIC BLOOD PRESSURE: 140 MMHG | OXYGEN SATURATION: 95 % | HEIGHT: 67 IN | WEIGHT: 217.37 LBS | RESPIRATION RATE: 18 BRPM | HEART RATE: 79 BPM

## 2023-08-17 DIAGNOSIS — S01.81XA FACIAL LACERATION, INITIAL ENCOUNTER: Primary | ICD-10-CM

## 2023-08-17 PROCEDURE — 12051 INTMD RPR FACE/MM 2.5 CM/<: CPT

## 2023-08-17 PROCEDURE — 2500000003 HC RX 250 WO HCPCS: Performed by: STUDENT IN AN ORGANIZED HEALTH CARE EDUCATION/TRAINING PROGRAM

## 2023-08-17 PROCEDURE — 99284 EMERGENCY DEPT VISIT MOD MDM: CPT

## 2023-08-17 PROCEDURE — 90471 IMMUNIZATION ADMIN: CPT | Performed by: STUDENT IN AN ORGANIZED HEALTH CARE EDUCATION/TRAINING PROGRAM

## 2023-08-17 PROCEDURE — 6360000002 HC RX W HCPCS: Performed by: STUDENT IN AN ORGANIZED HEALTH CARE EDUCATION/TRAINING PROGRAM

## 2023-08-17 PROCEDURE — 90715 TDAP VACCINE 7 YRS/> IM: CPT | Performed by: STUDENT IN AN ORGANIZED HEALTH CARE EDUCATION/TRAINING PROGRAM

## 2023-08-17 RX ADMIN — LIDOCAINE HYDROCHLORIDE 20 ML: 10; .005 INJECTION, SOLUTION EPIDURAL; INFILTRATION; INTRACAUDAL; PERINEURAL at 16:07

## 2023-08-17 RX ADMIN — TETANUS TOXOID, REDUCED DIPHTHERIA TOXOID AND ACELLULAR PERTUSSIS VACCINE, ADSORBED 0.5 ML: 5; 2.5; 8; 8; 2.5 SUSPENSION INTRAMUSCULAR at 16:05

## 2023-08-17 ASSESSMENT — PAIN DESCRIPTION - ORIENTATION: ORIENTATION: LEFT

## 2023-08-17 ASSESSMENT — PAIN SCALES - GENERAL
PAINLEVEL_OUTOF10: 0
PAINLEVEL_OUTOF10: 0

## 2023-08-17 ASSESSMENT — PAIN DESCRIPTION - LOCATION: LOCATION: HEAD

## 2023-08-17 ASSESSMENT — LIFESTYLE VARIABLES: HOW OFTEN DO YOU HAVE A DRINK CONTAINING ALCOHOL: NEVER

## 2023-08-17 ASSESSMENT — PAIN - FUNCTIONAL ASSESSMENT
PAIN_FUNCTIONAL_ASSESSMENT: 0-10
PAIN_FUNCTIONAL_ASSESSMENT: ACTIVITIES ARE NOT PREVENTED
PAIN_FUNCTIONAL_ASSESSMENT: NONE - DENIES PAIN

## 2023-08-17 NOTE — ED NOTES
D/C: Order noted for d/c. Pt confirmed d/c paperwork  have correct name. Discharge and education instructions reviewed with patient. Teach-back successful. Pt verbalized understanding and signed d/c papers. Pt denied questions at this time. No acute distress noted. Patient instructed to follow-up as noted - return to emergency department if symptoms worsen. Patient verbalized understanding. Discharged per EDMD with discharge instructions. Pt discharged to private vehicle. Patient stable upon departure. Thanked patient for choosing [facility]) for care. Provider aware of patient pain at time of discharge.        Gilberto Ponce RN  08/17/23 [id number]

## 2023-08-17 NOTE — DISCHARGE INSTRUCTIONS
Follow-up with your family doctor or urgent care or return here to have your sutures removed in the next 7 to 10 days. You may take Tylenol and Motrin for pain control. Return if you develop any new or worsening symptoms.

## 2023-08-17 NOTE — ED PROVIDER NOTES
[address]      EMERGENCY MEDICINE     Pt Name: Chino Hnog  MRN: [id number]  [address] [facility] [place] 1960  Date of evaluation: 8/17/2023  Provider: Dennise Flores MD    CHIEF COMPLAINT       Chief Complaint   Patient presents with    Laceration     To forehead from piece of steel one hr ago      [address] [facility]   Chino Hong is a 70 y.o. male who presents to the emergency department for laceration to his forehead from a piece of steel that occurred 1 hour ago. His last tetanus shot is greater than 5 years ago. No other injuries. PASTMEDICAL HISTORY     Past Medical History:   Diagnosis Date    Allergic rhinitis     Asthma     Hypertension     Hypothyroidism     Influenza A 03/29/2017       Patient Active Problem List   Diagnosis Code    Closed nondisplaced fracture of fifth metatarsal bone of right foot S92.354A    Syncope and collapse R55     SURGICAL HISTORY     History reviewed. No pertinent surgical history.     CURRENT MEDICATIONS       Previous Medications    ALBUTEROL SULFATE HFA (VENTOLIN HFA) 108 (90 BASE) MCG/ACT INHALER    Inhale 2 puffs into the lungs 4 times daily as needed for Wheezing    ALBUTEROL SULFATE HFA (VENTOLIN HFA) 108 (90 BASE) MCG/ACT INHALER    Inhale 2 puffs into the lungs 4 times daily as needed for Wheezing    BUDESONIDE-FORMOTEROL (SYMBICORT) 160-4.5 MCG/ACT AERO    Inhale 2 puffs into the lungs 2 times daily    CETIRIZINE (ZYRTEC ALLERGY) 10 MG TABLET    Take 1 tablet by mouth daily    FLUTICASONE (FLONASE) 50 MCG/ACT NASAL SPRAY    2 sprays by Each Nostril route daily    LEVOTHYROXINE (SYNTHROID) 100 MCG TABLET    Take 1 tablet by mouth daily    LEVOTHYROXINE (SYNTHROID) 112 MCG TABLET    TAKE ONE TABLET BY MOUTH DAILY    METOPROLOL SUCCINATE (TOPROL XL) 100 MG EXTENDED RELEASE TABLET    TAKE 1 AND 1/2 TABLET BY MOUTH DAILY    MONTELUKAST (SINGULAIR) 10 MG TABLET    1 tab po daily    NIFEDIPINE (PROCARDIA

## 2024-01-31 NOTE — DISCHARGE SUMMARY
Hospitalist Discharge Summary    Patient ID:  Chaya Walker  [id number]  10 y.o.  1960    Admit date: 4/14/2021    Discharge date: 4/15/2021    Disposition: home    Admission Diagnoses:   Patient Active Problem List   Diagnosis    Closed nondisplaced fracture of fifth metatarsal bone of right foot    Syncope and collapse       Discharge Diagnoses: Active Problems:    Syncope and collapse  Resolved Problems:    * No resolved hospital problems. *      Code Status:  Full Code    Condition:  Stable    Discharge Diet: Diet:  DIET CARB CONTROL;    PCP to do list: Follow-up for improvement of symptoms    Hospital Course: 57-year-old male with past medical history of hypertension, hypothyroidism, hyperlipidemia presented to the hospital with syncopal episode at home. Patient states that he was in the kitchen when he was trying to get his medicines when he suddenly started feeling dizzy and fainted. Patient apparently fell to the floor. Dariana Aaron He said that he regained conscious immediately and did not have any confusion. Due to this patient was brought to the hospital.  Of note patient was diagnosed with COVID-19 pneumonia about a week ago. On arrival he was noted to be hemodynamically stable. Lab work showed mild hyponatremia but otherwise fairly unremarkable. Patient had CT of the chest performed which revealed right upper lobe segmental pulmonary embolism. Also showed bilateral groundglass opacities concerning for viral pneumonia. He was admitted to the hospital for further management.  Following problems were addressed during his hospital stay    Syncopal episode  -Patient did not have any further episodes of lightheadedness or dizziness while in the hospital     Acute pulmonary embolism  -Patient initially started on Lovenox and transitioned to Eliquis  -Lower extremity duplex scan was negative for DVT     COVID-19 pneumonia  -Not noted to be hypoxic during his stay so not given Decadron  -Continue monitor      Hyponatremia  -Continued IV fluids     Hypertension  -Continue home medication     Hyperlipidemia  -Continue statin    Discharge Medications:   Current Discharge Medication List      START taking these medications    Details   !! apixaban (ELIQUIS) 5 MG TABS tablet Take 2 tablets by mouth 2 times daily for 7 days  Qty: 28 tablet, Refills: 0      !! apixaban (ELIQUIS) 5 MG TABS tablet Take 1 tablet by mouth 2 times daily  Qty: 120 tablet, Refills: 0       !! - Potential duplicate medications found. Please discuss with provider. Current Discharge Medication List        Current Discharge Medication List      CONTINUE these medications which have NOT CHANGED    Details   pravastatin (PRAVACHOL) 20 MG tablet TAKE ONE TABLET BY MOUTH DAILY  Qty: 30 tablet, Refills: 3      montelukast (SINGULAIR) 10 MG tablet TAKE ONE TABLET BY MOUTH ONCE NIGHTLY  Qty: 30 tablet, Refills: 3      NIFEdipine (PROCARDIA XL) 30 MG extended release tablet TAKE ONE TABLET BY MOUTH DAILY  Qty: 30 tablet, Refills: 3      !! metoprolol succinate (TOPROL XL) 100 MG extended release tablet TAKE 1 AND 1/2 TABLET BY MOUTH DAILY  Qty: 45 tablet, Refills: 0      levothyroxine (SYNTHROID) 112 MCG tablet 1 tab po daily  Qty: 90 tablet, Refills: 0      fluticasone-salmeterol (ADVAIR DISKUS) 250-50 MCG/DOSE AEPB INHALE ONE PUFF BY MOUTH TWICE A DAY  Qty: 1 Inhaler, Refills: 3      fluticasone (FLONASE) 50 MCG/ACT nasal spray SPRAY TWO SPRAYS IN EACH NOSTRIL ONCE DAILY  Qty: 1 Bottle, Refills: 2      !! metoprolol succinate (TOPROL XL) 100 MG extended release tablet Take 1 tablet by mouth daily  Qty: 30 tablet, Refills: 3      albuterol sulfate HFA (VENTOLIN HFA) 108 (90 Base) MCG/ACT inhaler Inhale 2 puffs into the lungs 4 times daily as needed for Wheezing  Qty: 3 Inhaler, Refills: 1       !! - Potential duplicate medications found. Please discuss with provider.         Current Discharge Medication List      STOP taking these medications       metFORMIN (GLUCOPHAGE) 500 MG tablet Comments:   Reason for Stopping:                   Procedures: none    Assessment on Discharge: Stable, improved     Discharge ROS:  A complete review of systems was asked and negative except for none    Discharge Exam:  BP (!) 140/74   Pulse 90   Temp 98.9 °F (37.2 °C) (Oral)   Resp 16   Ht 5' 7\" (1.702 m)   Wt 216 lb 11.4 oz (98.3 kg)   SpO2 93%   BMI 33.94 kg/m²     Gen: NAD  HEENT: NC/AT, moist mucous membranes, no oropharyngeal erythema or exudate  Neck: supple, trachea midline, no anterior cervical or SC LAD  Heart:  Normal s1/s2, RRR, no murmurs, gallops, or rubs. no leg edema  Lungs:  CTA bilaterally, no wheeze,no rales or rhonchi, no use of accessory muscles  Abd: bowel sounds present, soft, nontender, nondistended, no masses  Extrem:  No clubbing, cyanosis,  no edema  Skin: no lesion or masses  Psych:  A & O x3  Neuro: grossly intact, moves all four extremities    Pertinent Studies During Hospital Stay:  Radiology:  Ct Head Wo Contrast    Result Date: 4/14/2021  EXAMINATION: CT OF THE HEAD WITHOUT CONTRAST  4/14/2021 10:22 am TECHNIQUE: CT of the head was performed without the administration of intravenous contrast. Dose modulation, iterative reconstruction, and/or weight based adjustment of the mA/kV was utilized to reduce the radiation dose to as low as reasonably achievable. COMPARISON: None. HISTORY: ORDERING SYSTEM PROVIDED HISTORY: syncope TECHNOLOGIST PROVIDED HISTORY: Reason for exam:->syncope Has a \"code stroke\" or \"stroke alert\" been called? ->No Decision Support Exception->Emergency Medical Condition (MA) Reason for Exam: syncope Acuity: Acute Type of Exam: Initial FINDINGS: BRAIN/VENTRICLES: There is no acute intracranial hemorrhage, mass effect or midline shift. No abnormal extra-axial fluid collection. The gray-white differentiation is maintained without evidence of an acute infarct.   There is no evidence of hydrocephalus. ORBITS: The visualized portion of the orbits demonstrate no acute abnormality. SINUSES: Opacification of maxillary sinuses sphenoid sinuses ethmoid air cells and frontal sinuses suggestive of chronic sinusitis. Air-fluid level in the maxillary sinuses raise the possibility of a superimposed acute sinusitis the mastoids are clear. SOFT TISSUES/SKULL:  No acute abnormality of the visualized skull or soft tissues. No acute intracranial abnormality. Paranasal sinus inflammatory disease. Possibly of acute sinusitis cannot be excluded     Xr Chest Portable    Result Date: 4/14/2021  EXAMINATION: ONE XRAY VIEW OF THE CHEST 4/14/2021 10:37 am COMPARISON: None available. HISTORY: ORDERING SYSTEM PROVIDED HISTORY: syncope TECHNOLOGIST PROVIDED HISTORY: Reason for exam:->syncope Reason for Exam: syncope Acuity: Acute Type of Exam: Initial FINDINGS: There is right basilar scarring with left midlung discoid atelectasis. The cardiac silhouette is within normal limits. There is no pneumothorax or pleural effusion. 1.  No acute abnormality. Ct Chest Pulmonary Embolism W Contrast    Result Date: 4/14/2021  EXAMINATION: CTA OF THE CHEST 4/14/2021 11:28 am TECHNIQUE: CTA of the chest was performed after the administration of intravenous contrast.  Multiplanar reformatted images are provided for review. MIP images are provided for review. Dose modulation, iterative reconstruction, and/or weight based adjustment of the mA/kV was utilized to reduce the radiation dose to as low as reasonably achievable. COMPARISON: 04/14/2021 HISTORY: ORDERING SYSTEM PROVIDED HISTORY: syncope; elevated ddimer; covid + TECHNOLOGIST PROVIDED HISTORY: Reason for exam:->syncope; elevated ddimer; covid + Decision Support Exception->Emergency Medical Condition (MA) Reason for Exam: syncope; elevated ddimer; covid + FINDINGS: Pulmonary Arteries: Motion artifact degrades image quality.   There is a subtle filling defect within the segmental right upper lobe pulmonary artery concerning for an acute pulmonary thromboembolus. There is no right ventricular heart strain. Mediastinum: Coronary artery calcifications are a marker of atherosclerosis. There are no enlarged thoracic lymph nodes. Lungs/pleura: The airway is patent. There is no pneumothorax or pleural effusion. There is bibasilar atelectasis. However, there is subtle nonspecific ground-glass opacities throughout the bilateral lungs favoring infectious/inflammatory process. Upper Abdomen: Images of the upper abdomen are unremarkable. Soft Tissues/Bones: Degenerative changes involve the thoracic spine. 1. Subtle filling defect within the right upper lobe segmental pulmonary artery concerning for an acute pulmonary thromboembolus. 2. Nonspecific bilateral ground-glass opacities favoring infectious/inflammatory process including atypical viral pneumonia. Critical results were called by Dr. Shantell Ball. Nadya Urena MD to Dr. Paulino Standing On 4/14/2021 at 12:51. Vl Extremity Venous Bilateral    Result Date: 4/15/2021  Vascular Lower Extremities DVT Study Procedure -- PRELIMINARY SONOGRAPHER REPORT --   Demographics   Patient Name       Christiana [place]   Date of Study      04/15/2021        Gender              Male   Patient Number     [id number]        Date of Birth       1960   Visit Number       [id number]         Age                 59 year(s)   Accession Number   [id number]        Room Number         5982   Corporate ID       [id number]          Mitesh Santillan RVT                                                           CCT   Ordering Physician Aliya Mackey MD Interpreting        Physician RYAN Gracia  Procedure Type of Study:   Veins:Lower Extremities DVT Study, VASC EXTREMITY VENOUS DUPLEX BILATERAL. Tech Comments Right Technically difficult exam due to patient scanned in COVID 19 precautions.  No evidence MCHC 34.5 31.0 - 36.0 g/dL    RDW 13.9 12.4 - 15.4 %    Platelets 330 (L) 704 - 450 K/uL    MPV 9.2 5.0 - 10.5 fL    Neutrophils % 69.8 %    Lymphocytes % 26.1 %    Monocytes % 3.8 %    Eosinophils % 0.0 %    Basophils % 0.3 %    Neutrophils Absolute 4.4 1.7 - 7.7 K/uL    Lymphocytes Absolute 1.6 1.0 - 5.1 K/uL    Monocytes Absolute 0.2 0.0 - 1.3 K/uL    Eosinophils Absolute 0.0 0.0 - 0.6 K/uL    Basophils Absolute 0.0 0.0 - 0.2 K/uL   POCT Glucose    Collection Time: 04/15/21  7:41 AM   Result Value Ref Range    POC Glucose 108 (H) 70 - 99 mg/dl    Performed on ACCU-CHEK          Follow up: with Irasema Bowers MD    Note that over 30 minutes was spent in preparing discharge papers, discussing discharge with patient, medication review, etc.    Thank you Irasema Bowers MD for the opportunity to be involved in this patient's care. If you have any questions or concerns please feel free to contact me at [phone].     Electronically signed by Primo Flores MD on 4/15/2021 at 12:46 PM None

## 2024-04-15 PROBLEM — E11.9 TYPE 2 DIABETES MELLITUS (HCC): Status: ACTIVE | Noted: 2024-04-15

## 2024-04-15 PROBLEM — E11.9 TYPE 2 DIABETES MELLITUS WITHOUT COMPLICATION, UNSPECIFIED WHETHER LONG TERM INSULIN USE (HCC): Status: ACTIVE | Noted: 2024-04-15

## 2024-11-04 ENCOUNTER — HOSPITAL ENCOUNTER (EMERGENCY)
Age: 64
Discharge: HOME | End: 2024-11-04
Payer: MEDICARE

## 2024-11-04 VITALS
WEIGHT: 208.34 LBS | BODY MASS INDEX: 32.7 KG/M2 | OXYGEN SATURATION: 96 % | SYSTOLIC BLOOD PRESSURE: 145 MMHG | HEART RATE: 80 BPM | TEMPERATURE: 98 F | DIASTOLIC BLOOD PRESSURE: 90 MMHG | HEIGHT: 67 IN | RESPIRATION RATE: 18 BRPM

## 2024-11-04 DIAGNOSIS — S61.411A LACERATION OF RIGHT HAND WITHOUT FOREIGN BODY, INITIAL ENCOUNTER: Primary | ICD-10-CM

## 2024-11-04 DIAGNOSIS — R03.0 ELEVATED BLOOD PRESSURE READING: ICD-10-CM

## 2024-11-04 PROCEDURE — 99283 EMERGENCY DEPT VISIT LOW MDM: CPT

## 2024-11-04 PROCEDURE — 12001 RPR S/N/AX/GEN/TRNK 2.5CM/<: CPT

## 2024-11-04 PROCEDURE — 2500000003 HC RX 250 WO HCPCS: Performed by: PHYSICIAN ASSISTANT

## 2024-11-04 RX ORDER — CEPHALEXIN 500 MG/1
500 CAPSULE ORAL 3 TIMES DAILY
Qty: 21 CAPSULE | Refills: 0 | Status: SHIPPED | OUTPATIENT
Start: 2024-11-04 | End: 2024-11-11

## 2024-11-04 RX ORDER — LIDOCAINE HYDROCHLORIDE 10 MG/ML
5 INJECTION, SOLUTION EPIDURAL; INFILTRATION; INTRACAUDAL; PERINEURAL ONCE
Status: COMPLETED | OUTPATIENT
Start: 2024-11-04 | End: 2024-11-04

## 2024-11-04 RX ADMIN — LIDOCAINE HYDROCHLORIDE 5 ML: 10 INJECTION, SOLUTION EPIDURAL; INFILTRATION; INTRACAUDAL; PERINEURAL at 15:49

## 2024-11-04 ASSESSMENT — PAIN DESCRIPTION - ONSET: ONSET: ON-GOING

## 2024-11-04 ASSESSMENT — PAIN SCALES - GENERAL
PAINLEVEL_OUTOF10: 5
PAINLEVEL_OUTOF10: 2
PAINLEVEL_OUTOF10: 0

## 2024-11-04 ASSESSMENT — PAIN DESCRIPTION - DESCRIPTORS: DESCRIPTORS: ACHING

## 2024-11-04 ASSESSMENT — PAIN DESCRIPTION - PAIN TYPE
TYPE: ACUTE PAIN
TYPE: ACUTE PAIN

## 2024-11-04 ASSESSMENT — PAIN DESCRIPTION - FREQUENCY: FREQUENCY: CONTINUOUS

## 2024-11-04 ASSESSMENT — PAIN - FUNCTIONAL ASSESSMENT
PAIN_FUNCTIONAL_ASSESSMENT: NONE - DENIES PAIN
PAIN_FUNCTIONAL_ASSESSMENT: ACTIVITIES ARE NOT PREVENTED
PAIN_FUNCTIONAL_ASSESSMENT: 0-10

## 2024-11-04 ASSESSMENT — PAIN DESCRIPTION - ORIENTATION
ORIENTATION: RIGHT
ORIENTATION: RIGHT

## 2024-11-04 ASSESSMENT — LIFESTYLE VARIABLES
HOW MANY STANDARD DRINKS CONTAINING ALCOHOL DO YOU HAVE ON A TYPICAL DAY: PATIENT DOES NOT DRINK
HOW OFTEN DO YOU HAVE A DRINK CONTAINING ALCOHOL: NEVER

## 2024-11-04 ASSESSMENT — PAIN DESCRIPTION - LOCATION
LOCATION: HAND
LOCATION: FINGER (COMMENT WHICH ONE)
LOCATION: HAND

## 2024-11-04 NOTE — ED NOTES
D/C: Order noted for d/c. Pt confirmed d/c paperwork has correct name. Discharge and education instructions reviewed with patient. Teach-back successful.  Pt verbalized understanding and denied questions at this time. No acute distress noted. Patient instructed to follow-up as noted - return to emergency department if symptoms worsen. Patient verbalized understanding. Discharged per EDMD with discharge instructions. Pt discharged to private vehicle. Patient stable upon departure. Thanked patient for [facility] for care. Provider aware of patient pain at time of discharge.

## 2024-11-04 NOTE — DISCHARGE INSTRUCTIONS
-Have sutures removed in 10-14 days by PCP or return to ED  -Wash twice a day with soap and water  -Keep clean, dry, and covered  -May shower but do not submerge in water or go swimming  -Take antibiotic to prevent infection  -If you noticed signs of infection such as redness, swelling, drainage, pain or fever, return to ED

## 2024-11-04 NOTE — ED PROVIDER NOTES
[facility] EMERGENCY DEPARTMENT  EMERGENCY DEPARTMENT ENCOUNTER        Pt Name: August Robledo  MRN: [id number]  Birthdate 1960  Date of evaluation: 11/4/2024  Provider: Tiffany Dodson PA-C  PCP: Yesika Alegria MD  Note Started: 3:41 PM EST 11/4/24      REGGIE. I have evaluated this patient.        CHIEF COMPLAINT       Chief Complaint   Patient presents with    Laceration     Laceration to right hand base of 4th digit while cleaning metal.         HISTORY OF PRESENT ILLNESS: 1 or more Elements     History From: patient     August Robledo is a 64 y.o. male who presents for laceration on the right hand at the base of the ring finger that occurred just prior to arrival.  Laceration occurred from a piece of sheet metal.  Reports tetanus is UTD.  Fingertip feels a little tingly. He is RHD.    Nursing Notes were reviewed and agreed with or any disagreements were addressed in the HPI.    REVIEW OF SYSTEMS :      Review of Systems    Positives and Pertinent negatives as per HPI.     SURGICAL HISTORY   History reviewed. No pertinent surgical history.    CURRENTMEDICATIONS       Previous Medications    ALBUTEROL SULFATE HFA (VENTOLIN HFA) 108 (90 BASE) MCG/ACT INHALER    Inhale 2 puffs into the lungs 4 times daily as needed for Wheezing    ALBUTEROL SULFATE HFA (VENTOLIN HFA) 108 (90 BASE) MCG/ACT INHALER    Inhale 2 puffs into the lungs 4 times daily as needed for Wheezing    CETIRIZINE (ZYRTEC ALLERGY) 10 MG TABLET    Take 1 tablet by mouth daily    FLUTICASONE (FLONASE) 50 MCG/ACT NASAL SPRAY    SPRAY TWO SPRAYS IN EACH NOSTRIL ONCE DAILY    LEVOTHYROXINE (SYNTHROID) 112 MCG TABLET    TAKE 1 TABLET BY MOUTH DAILY    METOPROLOL SUCCINATE (TOPROL XL) 100 MG EXTENDED RELEASE TABLET    TAKE 1 TABLET BY MOUTH DAILY    NIFEDIPINE (PROCARDIA XL) 30 MG EXTENDED RELEASE TABLET    TAKE 1 TABLET BY MOUTH DAILY    PRAVASTATIN (PRAVACHOL) 20 MG TABLET    TAKE 1 TABLET BY MOUTH DAILY